# Patient Record
(demographics unavailable — no encounter records)

---

## 2025-03-12 NOTE — ASSESSMENT
[FreeTextEntry1] : Roxie is an 11 year old female with mild L ankle sprain, 3/4/25.  The history was obtained today from the child and parent; given the patient's age and/or the child's mental capacity, the history was unreliable and the parent was used as an independent historian.   Diagnosis of left ankle sprain was discussed with family today.  Clinically she is doing quite well today with minimal discomfort laterally upon supination of the foot.  She is able to ambulate without any intervention or crutches.  She has been participating in gym class.  I explained that for the next 10 days or so, she may utilize an ankle elastic support as needed if she is having difficulty with activity.  Otherwise she can continue with activity for as tolerated over the next 10 days.  After 10 days, she may resume all activities without restriction.  We discussed the possibility of recurrent ankle sprains in the future.  At this time, no physical therapy is needed.  She will follow-up with us on an as-needed basis if any concerns or issues persist. This plan was discussed with family and all questions and concerns were addressed today.  I, Julianne Gómez PA-C, have acted as a scribe and documented the above for Dr. Osullivan

## 2025-03-12 NOTE — CONSULT LETTER
[Consult Letter:] : I had the pleasure of evaluating your patient, [unfilled]. [Please see my note below.] : Please see my note below. [Sincerely,] : Sincerely, [FreeTextEntry3] :  Elroy Osullivan MD [facility] [address] [place] [address] Phone: [phone] / Fax: [phone]

## 2025-03-12 NOTE — REASON FOR VISIT
[Consultation] : a consultation visit [Patient] : patient [Father] : father [FreeTextEntry1] : left ankle sprain

## 2025-03-12 NOTE — PHYSICAL EXAM
[FreeTextEntry1] : Healthy appearing 11 year-old child. Awake, alert, in no acute distress. Pleasant and cooperative.  Eyes are clear with no sclera abnormalities. External ears, nose and mouth are clear.  Good respiratory effort with no audible wheezing without use of a stethoscope. Ambulates independently with no evidence of antalgia. Good coordination and balance. Able to get on and off exam table without difficulty.   Focused examination of the L ankle: Skin is clean, dry and intact. There is no erythema, swelling or ecchymosis. Minimal swelling laterally She is nontender to palpation grossly over bony and ligamentous anatomy of the ankle. Some discomfort laterally with supination of the foot Good subtalar motion is appreciated. Heels reconstitute into varus when on toes. Sensation is intact to light touch distally. 5/5 strength in EHL/FHL/TA/GS DP 2+, brisk capillary refill less than 2 seconds in all digits